# Patient Record
Sex: FEMALE | URBAN - METROPOLITAN AREA
[De-identification: names, ages, dates, MRNs, and addresses within clinical notes are randomized per-mention and may not be internally consistent; named-entity substitution may affect disease eponyms.]

---

## 2020-08-07 ENCOUNTER — OFFICE VISIT (OUTPATIENT)
Dept: URBAN - METROPOLITAN AREA CLINIC 31 | Facility: CLINIC | Age: 75
End: 2020-08-07

## 2020-08-07 NOTE — HPI-MIGRATED HPI
;     Anemia : 74 year female presents for consult of anemia. Patient was first diagnosed by Dr. Lesa Alcaraz, and the most recent labs were taken 2020.  Patient currently states that she is/not currently taking any iron supplements and that she has/have not had a transfusion.  Patient denies/admits NSAID use, a history of GI bleeding, any blood or melena in stools, epigastric/abdominal pains, fatigue, cold extremities, light headedness, dizziness.   Patient admits/denies previous EGD/Colon.   Labs 2020:  HGB: 10.6, HCT: 87.7, MCV: 28.3, MCH: 28.3, PLT: 262, Iron total: 39, Iron Bindin, Saturation: 15  Labs 3/5/2020: HGB: 11.2, HCT 33.8;